# Patient Record
(demographics unavailable — no encounter records)

---

## 2025-02-24 NOTE — ASSESSMENT
[FreeTextEntry1] : right wrist pain  The history for today's visit was obtained from the child, as well as the parent. The child's history was unreliable alone due to age and therefore, the parent was used today as an independent historian.  Three views of the right wrist in the office today reveal good overall alignment. No evidence of fracture or dislocation. no Healing response. skeletally mature. At this time, a course of OT is recommended to work on strengthening and ROM. She has been using the brace constantly since November and this can be adding to the pain as it is causing weakness and reliance on the brace. No upper body activity in gym class due to the pain for 8 weeks, while she is doing OT. She will f/u with a hand specialist if pain persists despite OT and further imaging or studies may be considered. All questions answered. Parent in agreement with the plan.  Liz OWENS, ROXANNE, PAC, have acted as a scribe and documented the above for Dr. Bright.   The above documentation completed by the PA is an accurate record of both my words and actions. Delon Bright MD.  This note was generated using Dragon medical dictation software.  A reasonable effort has been made for proofreading its contents, but typos may still remain.  If there are any questions or points of clarification needed please do not hesitate to contact my office.

## 2025-02-24 NOTE — BIRTH HISTORY
[Duration: ___ wks] : duration: [unfilled] weeks [] :  [___ lbs.] : [unfilled] lbs [___ oz.] : [unfilled] oz. [Was child in NICU?] : Child was in NICU [FreeTextEntry7] : 3 days

## 2025-02-24 NOTE — DEVELOPMENTAL MILESTONES
[Walk ___ Months] : Walk: [unfilled] months [Verbally] : verbally [Right] : right [FreeTextEntry2] : no [FreeTextEntry3] : wrist brace right

## 2025-02-24 NOTE — CONSULT LETTER
[Dear  ___] : Dear  [unfilled], [Consult Letter:] : I had the pleasure of evaluating your patient, [unfilled]. [Please see my note below.] : Please see my note below. [Consult Closing:] : Thank you very much for allowing me to participate in the care of this patient.  If you have any questions, please do not hesitate to contact me. [Sincerely,] : Sincerely, [FreeTextEntry3] : Delon Bright MD [facility] [address] [address] Phone: [phone] Fax: [phone]

## 2025-02-24 NOTE — END OF VISIT
Otc Regimen: Vaseline bid Detail Level: Zone [Time Spent: ___ minutes] : I have spent [unfilled] minutes of time on the encounter which excludes teaching and separately reported services. Samples Given: Opzelura bid

## 2025-02-24 NOTE — HISTORY OF PRESENT ILLNESS
[Stable] : stable [FreeTextEntry1] : 15 yo RHD female presents with mother for evaluation of right wrist and hand pain since November 2024. The patient denies any injury that started to pain. She has been using a wrist brace constantly since November. She states there is no improvement. She has tried tylenol and motrin without relief. She states she is failing Gym class as she cannot participate in Volleyball as this increases her pain. She denies any change in activity or starting a new task when the pain began. She denies numbness or tingling. THe pain is more of a shooting pain into the fingers at times.

## 2025-02-24 NOTE — DATA REVIEWED
[de-identified] : 3 views of the right wrist in the office today reveal good overall alignment. No evidence of fracture or dislocation. no Healing response. skeletally mature.

## 2025-02-24 NOTE — REVIEW OF SYSTEMS
[Heart Problems] : heart problems [Joint Pains] : arthralgias [Change in Activity] : no change in activity [Rash] : no rash [Congestion] : no congestion [Feeding Problem] : no feeding problem [Joint Swelling] : no joint swelling

## 2025-02-24 NOTE — REASON FOR VISIT
[Initial Evaluation] : an initial evaluation [Patient] : patient [Mother] : mother [FreeTextEntry1] : right wrist pain

## 2025-03-26 NOTE — PHYSICAL EXAM
[FreeTextEntry1] : GAIT: No limp. Good coordination and balance noted. GENERAL: alert, cooperative pleasant young 17 yo female in NAD SKIN: The skin is intact, warm, pink and dry over the area examined. EYES: Normal conjunctiva, normal eyelids and pupils were equal and round. ENT: normal ears, normal nose and normal lips. CARDIOVASCULAR: brisk capillary refill, but no peripheral edema. RESPIRATORY: The patient is in no apparent respiratory distress. They're taking full deep breaths without use of accessory muscles or evidence of audible wheezes or stridor without the use of a stethoscope. Normal respiratory effort. ABDOMEN: not examined    R wrist: + TTP over forearm volar and dorsal musculature + pain with ROM of the wrist reports diffuse numbness in all fingers with hyperextension and hyperflexion  R shoulder: Skin intact + pain with PROM of shoulder 5/5 strength of supraspinatus/infraspinatus/subscapularis/teres minor Negative drop sign test, negative richard's test Negative external rotation lag sign, negative internal rotation lag sign Lift off test intact Belly press intact, bear hug intact + pain with vaughan's test at AC joint or glenohumeral joint + speed's test + sanchez impingement test She is diffusely tender to palpation over all bony prominence

## 2025-03-26 NOTE — CONSULT LETTER
[Dear  ___] : Dear  [unfilled], [Consult Letter:] : I had the pleasure of evaluating your patient, [unfilled]. [Please see my note below.] : Please see my note below. [Consult Closing:] : Thank you very much for allowing me to participate in the care of this patient.  If you have any questions, please do not hesitate to contact me. [Sincerely,] : Sincerely, [FreeTextEntry3] : Delon Bright MD [facility] [address] [address] Phone: [phone] Fax: [phone]

## 2025-03-26 NOTE — HISTORY OF PRESENT ILLNESS
[FreeTextEntry1] : 15 yo RHD female presents with mother for f/u of right wrist and hand pain since November 2024.   The patient denies any injury that started to pain. She has been using a wrist brace constantly since November. She states there is no improvement. She has tried tylenol and motrin without relief. She states she is failing Gym class as she cannot participate in Volleyball as this increases her pain. She denies any change in activity or starting a new task when the pain began. She denies numbness or tingling.   She was initially seen in this office by Dr. Bright on 2/24/25.  At that time, he recommended discontinuing her wrist immobilizer to start a course of occupational therapy.  She reports that since that visit she is started occupational therapy 3 times a week.  However she continues to use the brace as she felt more discomfort when she removes the brace.  Also since starting occupational therapy she reports pain that shoots up to her shoulder.  She went to the hospital where x-rays of her right shoulder were taken and were negative for any osseous abnormalities.  She has tried taking over-the-counter pain medication including a combination Tylenol Motrin tablet.  [Stable] : stable

## 2025-03-26 NOTE — DATA REVIEWED
[de-identified] : R shoulder XRs from the hospital were viewed and independently interpreted: The patient is skeletally mature. No fracture, or dislocation noted. No bony abnormalities noted. No soft tissue findings.   3 views of the right wrist in the office today reveal good overall alignment. No evidence of fracture or dislocation. no Healing response. skeletally mature.

## 2025-03-26 NOTE — REVIEW OF SYSTEMS
[Change in Activity] : no change in activity [Rash] : no rash [Heart Problems] : heart problems [Congestion] : no congestion [Feeding Problem] : no feeding problem [Joint Pains] : arthralgias [Joint Swelling] : no joint swelling

## 2025-03-26 NOTE — ASSESSMENT
[FreeTextEntry1] : ANNEMARIE is a 16 year old F with R shoulder and R wrist pain.  Today's visit included obtaining the history from the child and parent, due to the child's age, the child could not be considered a reliable historian, requiring the parent to act as an independent historian. The condition, natural history, and prognosis were explained to the patient and family. The clinical findings and images were reviewed with the family.   X-rays are negative for any osseous abnormalities.  Clinically she has diffuse tenderness palpation over her entire right upper extremity.  There is no focal area of pain.  She has no evidence of a rotator cuff injury or tear.  Recommendation is to continue physical therapy, but to incorporate shoulder exercises.  I discussed that it is not uncommon to have improved.  If increased pain or discomfort once beginning physical therapy.  I have also advised her to discontinue the brace completely since she has been immobilized since November 2024.  With regards to taking over-the-counter pain medication, she is taking a combination tablet that is 250 mg of Tylenol and 125 mg of Motrin.  She takes 2 tablets at a time.  I discussed that the dose is based upon weight, and is not sufficient.  I would recommend that she take Tylenol and Motrin separately.  She may take 800 mg of motrin q8 hrs and plus or minus 500-1000 mg of tylenol q 8 hrs depending on the severity of her pain. I have advised her to take medication ATC for 5 days, then to take it PRN.  F/u in 6 weeks for repeat clinical evaluation.  All questions were answered, the family expresses understanding and agrees with the plan of care.   This note was generated using Dragon medical dictation software. A reasonable effort has been made for proofreading its contents, but typos may still remain. If there are any questions or points of clarification needed please do not hesitate to contact my office.

## 2025-03-26 NOTE — REASON FOR VISIT
[Follow Up] : a follow up visit [FreeTextEntry1] : right wrist pain, R shoulder pain [Patient] : patient [Mother] : mother

## 2025-05-21 NOTE — HISTORY OF PRESENT ILLNESS
[FreeTextEntry1] : 17 yo RHD female presents with mother for f/u of right wrist and hand pain since November 2024.   The patient denies any injury that started to pain. She has been using a wrist brace constantly since November. She states there is no improvement. She has tried tylenol and motrin without relief. She states she is failing Gym class as she cannot participate in Volleyball as this increases her pain. She denies any change in activity or starting a new task when the pain began. She denies numbness or tingling.   She was initially seen in this office by Dr. Bright on 2/24/25.  At that time, he recommended discontinuing her wrist immobilizer to start a course of occupational therapy.  She reports that since that visit she is started occupational therapy 3 times a week.  However she continues to use the brace as she felt more discomfort when she removes the brace.  Also since starting occupational therapy she reports pain that shoots up to her shoulder.  She went to the hospital where x-rays of her right shoulder were taken and were negative for any osseous abnormalities.  She has tried taking over-the-counter pain medication including a combination Tylenol Motrin tablet.   During her last office visit we recommended an MRI of the right shoulder.  She presents today to review MRI results.  An MRI was done at [facility] radiology.  She reports she still has significant pain, no improvement with physical therapy.  She also reports she is unable to take Tylenol as she has a history of a fatty liver.  She has no pain relief when taking Motrin. [Stable] : stable

## 2025-05-21 NOTE — ASSESSMENT
[FreeTextEntry1] : ANNEMARIE is a 16 year old F with R shoulder and R wrist pain.  Today's visit included obtaining the history from the child and parent, due to the child's age, the child could not be considered a reliable historian, requiring the parent to act as an independent historian. The condition, natural history, and prognosis were explained to the patient and family. The clinical findings and images were reviewed with the family.   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  Annemarie continues to have significant pain despite persistent physical therapy.  Recommendation at this time is for a pain management referral to see if she would possibly be indicated for cortisone injection.  I would like to see her back in 3 months for repeat clinical evaluation.  All questions were answered, the family expresses understanding and agrees with the plan of care.  This note was generated using Dragon medical dictation software. A reasonable effort has been made for proofreading its contents, but typos may still remain. If there are any questions or points of clarification needed please do not hesitate to contact my office.

## 2025-05-21 NOTE — CONSULT LETTER
[FreeTextEntry3] : Delon Bright MD [facility] [address] [address] Phone: [phone] Fax: [phone]

## 2025-05-21 NOTE — DATA REVIEWED
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 No fracture, or dislocation noted. No bony abnormalities noted. No soft tissue findings.   3 views of the right wrist in the office today reveal good overall alignment. No evidence of fracture or dislocation. no Healing response. skeletally mature.

## 2025-05-21 NOTE — PHYSICAL EXAM
[FreeTextEntry1] : GAIT: No limp. Good coordination and balance noted. GENERAL: alert, cooperative pleasant young 15 yo female in NAD SKIN: The skin is intact, warm, pink and dry over the area examined. EYES: Normal conjunctiva, normal eyelids and pupils were equal and round. ENT: normal ears, normal nose and normal lips. CARDIOVASCULAR: brisk capillary refill, but no peripheral edema. RESPIRATORY: The patient is in no apparent respiratory distress. They're taking full deep breaths without use of accessory muscles or evidence of audible wheezes or stridor without the use of a stethoscope. Normal respiratory effort. ABDOMEN: not examined    R wrist: + TTP over forearm volar and dorsal musculature + pain with ROM of the wrist reports diffuse numbness in all fingers with hyperextension and hyperflexion  R shoulder: Skin intact + pain with PROM of shoulder 5/5 strength of supraspinatus/infraspinatus/subscapularis/teres minor Negative drop sign test, negative richard's test Negative external rotation lag sign, negative internal rotation lag sign Lift off test intact Belly press intact, bear hug intact + pain with vaughan's test at AC joint or glenohumeral joint + speed's test + sanchez impingement test She is diffusely tender to palpation over all bony prominence  Cell[employer] Counseling:  I discussed with the patient the risks of mycophenolate mofetil including but not limited to infection/immunosuppression, GI upset, hypokalemia, hypercholesterolemia, bone marrow suppression, lymphoproliferative disorders, malignancy, GI ulceration/bleed/perforation, colitis, interstitial lung disease, kidney failure, progressive multifocal leukoencephalopathy, and birth defects.  The patient understands that monitoring is required including a baseline creatinine and regular CBC testing. In addition, patient must alert us immediately if symptoms of infection or other concerning signs are noted.

## 2025-07-17 NOTE — PHYSICAL EXAM
[FreeTextEntry1] : General: Well-developed, well-nourished individual in no acute distress Skin: No noted swelling, discoloration, or hair/nail growth changes. Allodynia to light pressure noted throughout the right arm. Vessels: No lower extremity edema Lung: Breathing is comfortable and regular.  Mental: Age-appropriate mood and affect. Normal speech and thought processing for age  Neurologic: -- Strength: Right upper extremity strength testing was globally limited due to pain. Left upper extremity 5/5 throughout. -- Sensation: Reports occasional numbness in right fingers. Reports pins and needles with ice. Allodynia to light pressure on right arm. Sensation intact in the neck, no tenderness to palpation at the cervical paraspinal region however has tenderness on the R trapezius.   Musculoskeletal: -- Spine: Normal pain-free range of motion of the cervical spine. No tenderness on palpation of the cervical spine. Tenderness to palpation over right upper trapezius and supraspinatus. -- Joint ROM: -- Right Shoulder: Severely limited active and passive range of motion in all planes (flexion, abduction, internal rotation, external rotation) secondary to pain. -- Right Elbow/Wrist: Full range of motion, but movement elicits pain referred from the shoulder/upper arm. -- Left Shoulder/Elbow/Wrist: Full, pain-free range of motion. -- Special Tests (Right Shoulder/Wrist): All special testing/ maneuvers of the right arm (neers, sanchez, empty can, phalens) reproduced non-specific pain at the arm.

## 2025-07-17 NOTE — ASSESSMENT
[FreeTextEntry1] : Tita is a 16-year-old female with a several-month history of progressive, diffuse right upper extremity pain. Her exam is notable for severely limited active and passive range of motion due to pain, which is disproportionate to the reported MRI findings of bursitis and tendinitis. Findings including diffuse pain, allodynia, and functional limitations may be suggestive of a centralized pain process, such as Complex Regional Pain Syndrome (CRPS). She may additionally have other findings such as adhesive capsulitis vs shoulder impingement however this is difficult to assess at this time, given her diffuse pain.   Plan: 1. Amitriptyline 10 mg was prescribed to be taken nightly for management of neuropathic pain and to improve sleep, with instructions to titrate up weekly by 10 mg as tolerated to a maximum of 50 mg. 2. A referral was placed for physical therapy with a specific focus on desensitization techniques and graded motor imagery for CRPS. 3. A follow-up appointment was scheduled in 3-4 weeks to monitor medication tolerance and initial response to therapy.  Plan was reviewed with mom as described above and all questions answered accordingly. They demonstrated understanding of therapy options and agreed with the treatment plan.  ---------------------------------------- This note was created using Voice Recognition Software and may have been partially created using QRcao software which was then reviewed and edited to the best of my ability. Sporadic inaccurate translation may have occurred.

## 2025-07-17 NOTE — HISTORY OF PRESENT ILLNESS
[FreeTextEntry1] : Tita is a 16-year-old, right-handed female who presents for evaluation of right upper extremity pain. The pain began in her right wrist in November and has since progressed up the entire arm to include the elbow and shoulder. The pain is described as pulsating and sharp. It significantly limits her ability to write in school and participate in gym. There was no inciting trauma. She reports occasional numbness in her right hand. An MRI in March showed distal clavicle osteolysis and mild supraspinatus tendinitis, and minimal subacromial bursitis. Previous trials of physical therapy were not helpful as she was limited by pain. She has tried Tylenol, but use is limited due to a history of fatty liver (now resolved). Icing the area causes a pins and needles sensation. A wrist brace provided some relief, but she was advised to discontinue its use. She reports poor sleep due to pain and significant daytime fatigue.  Pain: -- Location: Entire right upper extremity, from hand/wrist to shoulder -- Onset: November, spontaneous -- Quality: Pulsing and sharp -- Triggers: Movement, writing, gym participation -- Associated Symptoms: Occasional finger numbness; pins and needles sensation with ice application  Services: -- PT: Previously attended PT but was unable to fully participate due to pain  Equipment: -- Previously used a wrist brace which she felt was helpful for compression  Sleep: -- Reports poor sleep with tossing and turning due to pain -- Reports significant daytime fatigue